# Patient Record
Sex: MALE | Race: WHITE | NOT HISPANIC OR LATINO | Employment: OTHER | URBAN - METROPOLITAN AREA
[De-identification: names, ages, dates, MRNs, and addresses within clinical notes are randomized per-mention and may not be internally consistent; named-entity substitution may affect disease eponyms.]

---

## 2017-01-11 PROBLEM — I70.90 ATHEROSCLEROSIS: Status: ACTIVE | Noted: 2017-01-11

## 2017-05-01 ENCOUNTER — OFFICE VISIT (OUTPATIENT)
Dept: SURGERY | Facility: CLINIC | Age: 67
End: 2017-05-01
Payer: COMMERCIAL

## 2017-05-01 VITALS
BODY MASS INDEX: 28.27 KG/M2 | WEIGHT: 226.19 LBS | HEART RATE: 75 BPM | TEMPERATURE: 97 F | DIASTOLIC BLOOD PRESSURE: 74 MMHG | SYSTOLIC BLOOD PRESSURE: 122 MMHG

## 2017-05-01 DIAGNOSIS — R10.32 GROIN PAIN, LEFT: Primary | ICD-10-CM

## 2017-05-01 PROCEDURE — 3078F DIAST BP <80 MM HG: CPT | Mod: S$GLB,,, | Performed by: SURGERY

## 2017-05-01 PROCEDURE — 99204 OFFICE O/P NEW MOD 45 MIN: CPT | Mod: S$GLB,,, | Performed by: SURGERY

## 2017-05-01 PROCEDURE — 99999 PR PBB SHADOW E&M-EST. PATIENT-LVL II: CPT | Mod: PBBFAC,,, | Performed by: SURGERY

## 2017-05-01 PROCEDURE — 1160F RVW MEDS BY RX/DR IN RCRD: CPT | Mod: S$GLB,,, | Performed by: SURGERY

## 2017-05-01 PROCEDURE — 3074F SYST BP LT 130 MM HG: CPT | Mod: S$GLB,,, | Performed by: SURGERY

## 2017-05-01 NOTE — PROGRESS NOTES
Subjective:       Patient ID: Petey Webber is a 66 y.o. male.    Chief Complaint: Consult (possible inguinal hernia)    HPI this is a 66-year-old gentleman referred for evaluation of a possible left inguinal hernia.  He has never seen a bulge in that area.  He has pain while lifting.  The pain was first noticed when golfing back in February.  He subsequent slipped while fishing and felt the pain again.  It had improved somewhat in between the 2 occurrences.  He has never seen a bulge in the area.  He is not having any nausea or vomiting.  He has normal bowel movements.  He denies any fever or chills.  He says the pain recently has been all day long and neck see wakes him up early in the morning.  He has no urinary symptoms.  He has no history of abdominal surgery.  He does have a reported history of 3 TIAs in the 90s but no problems since then.  He is not medicated for this.    Review of Systems   Constitutional: Negative for activity change, chills, fever and unexpected weight change.   HENT: Negative for congestion, hearing loss, sore throat and voice change.    Eyes: Negative.    Respiratory: Negative for cough, shortness of breath and wheezing.    Cardiovascular: Negative for chest pain and palpitations.   Gastrointestinal: Negative for abdominal pain, blood in stool, nausea and vomiting.   Genitourinary: Negative for dysuria, frequency and hematuria.        Groin pain   Musculoskeletal: Negative for arthralgias, back pain and neck pain.   Skin: Negative for color change and wound.   Allergic/Immunologic: Negative.    Neurological: Negative for dizziness, weakness and headaches.   Hematological: Negative for adenopathy. Does not bruise/bleed easily.   Psychiatric/Behavioral: Negative for confusion and dysphoric mood. The patient is not nervous/anxious.      Objective:     Physical Exam   Constitutional: He is oriented to person, place, and time. He appears well-developed and well-nourished. No distress.   HENT:    Head: Normocephalic and atraumatic.   Mouth/Throat: Oropharynx is clear and moist. No oropharyngeal exudate.   Eyes: Conjunctivae and EOM are normal. Pupils are equal, round, and reactive to light. No scleral icterus.   Neck: Normal range of motion. Neck supple. No thyromegaly present.   Cardiovascular: Normal rate, regular rhythm, normal heart sounds and intact distal pulses.    No murmur heard.  Pulmonary/Chest: Effort normal and breath sounds normal. No respiratory distress. He has no wheezes. He has no rales.   Abdominal: Soft. Bowel sounds are normal. He exhibits no distension and no mass. There is no tenderness. No hernia.   Genitourinary:   Genitourinary Comments: Careful examination of the inguinal area does not reveal any evidence of an inguinal hernia.  The external ring is palpable with no bulging.  He is tender in the left groin on manipulation.  There is no tenderness or palpable hernia on the right.   Musculoskeletal: Normal range of motion. He exhibits no edema or tenderness.   Lymphadenopathy:     He has no cervical adenopathy.   Neurological: He is alert and oriented to person, place, and time. No cranial nerve deficit.   Skin: Skin is warm and dry. No rash noted. No erythema.   Psychiatric: He has a normal mood and affect. His behavior is normal.     Assessment:     Encounter Diagnosis   Name Primary?    Groin pain, left Yes       Plan:      1.  No evidence of inguinal hernia at this time.  We discussed the symptoms of a groin strain.  He will observe the area for development of a bulge.  2.  Plan to follow-up with me in 6 months for reevaluation or sooner if he develops a bulge of the pain worsens.  We could consider imaging at that time.  3.  All questions were answered.

## 2017-07-20 PROBLEM — A69.20 LYME DISEASE, ACUTE: Status: ACTIVE | Noted: 2017-07-20

## 2018-02-21 PROBLEM — I67.2 CEREBRAL ATHEROSCLEROSIS: Status: ACTIVE | Noted: 2018-02-21

## 2018-10-03 PROBLEM — I63.9 CEREBELLAR INFARCTION: Status: ACTIVE | Noted: 2018-10-03

## 2018-10-04 ENCOUNTER — TELEPHONE (OUTPATIENT)
Dept: HEMATOLOGY/ONCOLOGY | Facility: CLINIC | Age: 68
End: 2018-10-04

## 2018-10-04 NOTE — TELEPHONE ENCOUNTER
----- Message from Cayla Reveles sent at 10/4/2018  1:06 PM CDT -----  Contact: [phone]  Type: Needs appointment    Who Called:  Patient  Best Call Back Number: [phone]  Additional Information: Patient is being referred by Dr. Manuel Robles/needs appointment (only information given)please call back to schedule or advise.

## 2018-10-09 ENCOUNTER — OFFICE VISIT (OUTPATIENT)
Dept: HEMATOLOGY/ONCOLOGY | Facility: CLINIC | Age: 68
End: 2018-10-09
Payer: MEDICARE

## 2018-10-09 VITALS
WEIGHT: 237.19 LBS | RESPIRATION RATE: 18 BRPM | DIASTOLIC BLOOD PRESSURE: 81 MMHG | HEIGHT: 74 IN | HEART RATE: 81 BPM | SYSTOLIC BLOOD PRESSURE: 124 MMHG | BODY MASS INDEX: 30.44 KG/M2 | TEMPERATURE: 98 F

## 2018-10-09 DIAGNOSIS — G45.9 TIA (TRANSIENT ISCHEMIC ATTACK): Primary | ICD-10-CM

## 2018-10-09 PROCEDURE — 99999 PR PBB SHADOW E&M-EST. PATIENT-LVL III: CPT | Mod: PBBFAC,,, | Performed by: INTERNAL MEDICINE

## 2018-10-09 PROCEDURE — 99213 OFFICE O/P EST LOW 20 MIN: CPT | Mod: PBBFAC,PN | Performed by: INTERNAL MEDICINE

## 2018-10-09 PROCEDURE — 99204 OFFICE O/P NEW MOD 45 MIN: CPT | Mod: S$PBB,,, | Performed by: INTERNAL MEDICINE

## 2018-10-09 NOTE — PROGRESS NOTES
Initial consultation for Petey Webber    HPI    67 years old  male [facility] for hyper[facility]agulable workup.  Patient is referred here by Dr. Robles for history of a TIA and recent headache for concern of clotting disorder. Per patient, he had a TIA 10 years ago, and recently he had headache, went to hospital for evaluation.  MRI of the head and the multiple study has been done.  MR images shows no restricted diffusion there are chronic in fraction in the right cerebral cerebellar hemisphere.  There are mild chronic ischemic small vessel degenerative white matter changes.  There is no mass or mass effect.  The ventricles are normal in size and configuration.  There are no abnormal extra-axial collections.  There is no bleeding.  Pituitary optic chiasm are grossly normal.  MR of the head shows no significant intracranial stenosis erupt vascular cutoff or aneurysm.  Patient denies any history of blood clot disorders, denies any history of blood disorders, both maternal and paternal do not have any history of clotting disorders.  Patient is a former smoker he quit in 1992 prior to that he smoked daily.  He does admit that he drinks on a daily basis.  He is  and retired from jobs.      Patient's medical history including hypertension or diabetes.   Per  Dr. Robles note, he was instructed to take aspirin and Plavix, however patient is only on aspirin high dose at this time.      Past Medical History:   Diagnosis Date    Atherosclerosis     CVA (cerebral infarction)     HTN (hypertension), malignant 7/8/2015    Hyperlipidemia 7/8/2015     Social History     Socioeconomic History    Marital status:      Spouse name: Xuanorrlc    Number of children: 1    Years of education: None    Highest education level: None   Social Needs    Financial resource strain: None    Food insecurity - worry: None    Food insecurity - inability: None    Transportation needs - medical: None     Transportation needs - non-medical: None   Occupational History    None   Tobacco Use    Smoking status: Former Smoker     Packs/day: 1.00     Years: 25.00     Pack years: 25.00     Last attempt to quit: 1992     Years since quittin.7    Smokeless tobacco: Never Used   Substance and Sexual Activity    Alcohol use: Yes     Alcohol/week: 7.2 oz     Types: 12 Glasses of wine per week    Drug use: No    Sexual activity: Yes     Partners: Female   Other Topics Concern    None   Social History Narrative    None         Subjective      Review of Systems   Constitutional: Negative for appetite change, fatigue and unexpected weight change.   HENT: Negative for mouth sores.   Eyes: Negative for visual disturbance.   Respiratory: Negative for cough and shortness of breath.   Cardiovascular: Negative for chest pain.   Gastrointestinal: Negative for diarrhea.   Genitourinary: Negative for frequency.   Musculoskeletal: Negative for back pain.   Skin: Negative for rash.   Neurological:  Headache and dizzness  Hematological: Negative for adenopathy.   Psychiatric/Behavioral: The patient is not nervous/anxious.   All other systems reviewed and are negative.     Objective    Physical Exam   Vitals:    10/09/18 0844   BP: 124/81   Pulse: 81   Resp: 18   Temp: 98.1 °F (36.7 °C)       Constitutional: patient is oriented to person, place, and time. patient appears well-developed and well-nourished. No distress.   HENT:   Right Ear: External ear normal.   Left Ear: External ear normal.   Nose: Nose normal.   Mouth/Throat: Oropharynx is clear and moist. No oropharyngeal exudate.   Teeth, gums and lips are normal   No sinus tenderness   Palate, tongue, posterior pharynx are normal   Eyes: Conjunctivae and lids are normal.   Neck: Trachea normal and normal range of motion. No thyromegaly   Cardiovascular: Normal rate, regular rhythm, normal heart sounds, intact distal pulses and normal pulses.   No murmur heard.   No edema,  no tenderness in the extremities.   Pulmonary/Chest: Effort normal and breath sounds normal. No accessory muscle usage. patient has no wheezes..   Abdominal: Soft. Normal appearance and bowel sounds are normal. patient exhibits no distension and no mass. There is no hepatosplenomegaly. There is no tenderness.   Musculoskeletal: Normal range of motion.   Gait is normal   No clubbing, cyanosis     Lymphadenopathy:   Head (right side): No submental and no submandibular adenopathy present.   Head (left side): No submental and no submandibular adenopathy present.   patient has no cervical adenopathy.   Right: No supraclavicular adenopathy present.   Left: No supraclavicular adenopathy present.   Neurological: patient is alert and oriented to person, place, and time. patient has normal strength and normal reflexes. No sensory deficit. Gait normal.   Skin: Skin is warm, dry and intact. No bruising, no lesion and no rash noted. No cyanosis. Nails show no clubbing.   No lesions   Psychiatric: patient has a normal mood and affect. patient speech is normal and behavior is normal. Judgment normal. Cognition and memory are normal.   Vitals reviewed.     Lab Results   Component Value Date    WBC 5.85 10/03/2018    HGB 14.8 10/03/2018    HCT 44.1 10/03/2018    MCV 87 10/03/2018     10/03/2018     CMP  Sodium   Date Value Ref Range Status   10/03/2018 140 136 - 145 mmol/L Final   09/04/2015 140 137 - 145 MMOL/L Final     Potassium   Date Value Ref Range Status   10/03/2018 4.5 3.5 - 5.1 mmol/L Final   09/04/2015 4.8 3.5 - 5.1 MMOL/L Final     Chloride   Date Value Ref Range Status   10/03/2018 103 95 - 110 mmol/L Final   09/04/2015 105 98 - 107 MMOL/L Final     CO2   Date Value Ref Range Status   10/03/2018 26 22 - 31 mmol/L Final     Glucose   Date Value Ref Range Status   10/03/2018 144 (H) 70 - 110 mg/dL Final     Comment:     The ADA recommends the following guidelines for fasting glucose:  Normal:       less than 100  mg/dL  Prediabetes:  100 mg/dL to 125 mg/dL  Diabetes:     126 mg/dL or higher       BUN, Bld   Date Value Ref Range Status   10/03/2018 21 9 - 21 mg/dL Final     Creatinine   Date Value Ref Range Status   10/03/2018 0.88 0.50 - 1.40 mg/dL Final   09/04/2015 1.17 0.66 - 1.25 MG/DL Final     Calcium   Date Value Ref Range Status   10/03/2018 9.2 8.4 - 10.2 mg/dL Final     Total Protein   Date Value Ref Range Status   10/03/2018 6.5 6.0 - 8.4 g/dL Final     Albumin   Date Value Ref Range Status   10/03/2018 4.0 3.5 - 5.2 g/dL Final   09/04/2015 4.0 3.5 - 5.0 G/DL Final     Total Bilirubin   Date Value Ref Range Status   10/03/2018 0.8 0.2 - 1.3 mg/dL Final     Alkaline Phosphatase   Date Value Ref Range Status   10/03/2018 48 38 - 145 U/L Final     AST ([place])   Date Value Ref Range Status   04/15/2016 31 17 - 59 U/L Final     AST   Date Value Ref Range Status   10/03/2018 29 17 - 59 U/L Final     ALT   Date Value Ref Range Status   10/03/2018 63 (H) 10 - 44 U/L Final     Anion Gap   Date Value Ref Range Status   10/03/2018 11 8 - 16 mmol/L Final     eGFR if    Date Value Ref Range Status   10/03/2018 >60 >60 mL/min/1.73 m^2 Final     eGFR if non    Date Value Ref Range Status   10/03/2018 >60 >60 mL/min/1.73 m^2 Final     Comment:     Calculation used to obtain the estimated glomerular filtration  rate (eGFR) is the CKD-EPI equation.            Assessment/plan    [] Prior history of a TIA, recent history of headache with a negative images workup.  Patient is asymptomatic at baseline doing this clinical visit.  Patient has negative family history of hypercoagulable disease.  Patient do not have any autoimmune disease.    [] There are chronic infarctions in the right cerebellar hemisphere.   -I will order basic hypercoagulable workup RTC 1 month  -continue routine malignancy screening per PCP      There are no diagnoses linked to this encounter.

## 2018-10-17 ENCOUNTER — TELEPHONE (OUTPATIENT)
Dept: NEUROSURGERY | Facility: CLINIC | Age: 68
End: 2018-10-17

## 2019-02-05 ENCOUNTER — OFFICE VISIT (OUTPATIENT)
Dept: NEUROLOGY | Facility: CLINIC | Age: 69
End: 2019-02-05
Payer: MEDICARE

## 2019-02-05 VITALS
SYSTOLIC BLOOD PRESSURE: 122 MMHG | HEART RATE: 69 BPM | WEIGHT: 237 LBS | DIASTOLIC BLOOD PRESSURE: 84 MMHG | RESPIRATION RATE: 17 BRPM | BODY MASS INDEX: 30.42 KG/M2 | HEIGHT: 74 IN

## 2019-02-05 DIAGNOSIS — I63.9 CEREBELLAR INFARCTION: Primary | ICD-10-CM

## 2019-02-05 DIAGNOSIS — G45.9 TIA (TRANSIENT ISCHEMIC ATTACK): ICD-10-CM

## 2019-02-05 PROCEDURE — 99999 PR PBB SHADOW E&M-EST. PATIENT-LVL III: CPT | Mod: PBBFAC,,, | Performed by: PSYCHIATRY & NEUROLOGY

## 2019-02-05 PROCEDURE — 99204 OFFICE O/P NEW MOD 45 MIN: CPT | Mod: S$PBB,,, | Performed by: PSYCHIATRY & NEUROLOGY

## 2019-02-05 PROCEDURE — 99204 PR OFFICE/OUTPT VISIT, NEW, LEVL IV, 45-59 MIN: ICD-10-PCS | Mod: S$PBB,,, | Performed by: PSYCHIATRY & NEUROLOGY

## 2019-02-05 PROCEDURE — 99213 OFFICE O/P EST LOW 20 MIN: CPT | Mod: PBBFAC,PO | Performed by: PSYCHIATRY & NEUROLOGY

## 2019-02-05 PROCEDURE — 99999 PR PBB SHADOW E&M-EST. PATIENT-LVL III: ICD-10-PCS | Mod: PBBFAC,,, | Performed by: PSYCHIATRY & NEUROLOGY

## 2019-02-05 RX ORDER — AMOXICILLIN 500 MG
CAPSULE ORAL DAILY
COMMUNITY
End: 2020-10-28

## 2019-02-05 NOTE — LETTER
February 5, 2019      AILYN Robles Jr., MD  [address]  [facility] [address]           [facility] [place]  [address] [facility] [address] [place] [address]  Phone: [phone]  Fax: [phone]          Patient: Petey Webber   MR Number: [id number]   YOB: 1950   Date of Visit: 2/5/2019       Dear Dr. AILYN Robles Jr.:    Thank you for referring Petey Webber to me for evaluation. Attached you will find relevant portions of my assessment and plan of care.    If you have questions, please do not hesitate to call me. I look forward to following Petey Webber along with you.    Sincerely,    Silvana Munoz MD    Enclosure  CC:  No Recipients    If you would like to receive this communication electronically, please contact [email or web address] or [phone] to request more information on [facility] access.    For providers and/or their staff who would like to refer a patient to [facility], please contact us through our one-stop-shop provider referral line, [facility], at [phone].    If you feel you have received this communication in error or would no longer like to receive these types of communications, please e-mail [email or web address]

## 2019-02-05 NOTE — PROGRESS NOTES
Date of service: 2/5/2019  Referring provider: Dr. AILYN Robles Jr.    Subjective:      Chief complaint: Headache       Patient ID: Petey Webber is a 68 y.o. gentleman with type 2 diabetes, hypertension, hyperlipidemia, history of stroke, who was presenting as a new patient evaluation of headache    History of Present Illness    ORIGINAL HEADACHE HISTORY - 02/05/2019  Age at onset and course over time:  In early Sept 2018 he had 2 weeks of pressure in the left temple followed by the event below.    He was seen at [facility] [place] on 09/27/2018 for possible TIA.  This started with dizziness (LH) and nausea followed by right face (corner of mouth and up to jaw) being numb (but not arm and leg). He doesn't know if there was a droop but he doesn't think there was. No hearing changes. No change to vision. No double vision. Total event time was 30 min. Subsided with rest. No photo or phonophobia.    In the past he had these same events, in the 1990s, with right sided nona-numbness and slurred speech. He has had at least 6 of these in the past that were stress induced.     In the hospital Brain MRI showed chronic stroke in the right cerebellar hemisphere.  MRA brain was normal. Echo showed ejection fraction 55-65%.  He did not have any posterior neck vessel imaging. There was concern that this was either vascular or related to migraine aura.  His aspirin was increased from baby to 325 mg daily.  He was continued on atorvastatin 80 mg. His LDL has never been 70 or below even with full dose statin. He takes it in the AM. A1c 6.5 LDL 90    He no longer has the headache described below, last time was in the fall of 2018 -   Location:  Left temple   Quality:  [x] pressure [] tight [] throbbing [] sharp [] stabbing   Severity:  Current 0, range 0 to 3  Duration:  Min  Frequency:  Seldom  Headaches awaken at night?:    Worst time of day:   Associated with: [] photophobia []  phonophobia [] osmophobia []  blurred vision  [] double vision [] loss of appetite [x] nausea [] vomiting [x] dizziness [] vertigo  [] tinnitus [] irritability [] sinus pressure [] problems with concentration   [] neck tightness   Alleviated by:  [] sleep [] darkness [] massage [] heat [] ice [] medication quiet/rest  Exacerbated by:  [] fatigue [] light [] noise [] smells [] coughing [] sneezing  [] bending over [] ovulation [] menses [] alcohol [] change in weather []  stress  Ipsilateral autonomic: [] nasal congestion [] lacrimation [] ptosis [] injection [] edema [] foreign body sensation [] ear fullness   ICP:   Sleep habits:  Caffeine intake: 0  Gyn status (if female):     Current acute treatment:  -- none     Current prevention:  --   -- Atorvastatin 80mg    Previously tried/failed acute treatment:  -- none    Previously tried/failed preventative treatment:  -- none     Review of patient's allergies indicates:  No Known Allergies  Current Outpatient Medications   Medication Sig Dispense Refill    aspirin (ECOTRIN) 325 MG EC tablet Take 325 mg by mouth once daily.      atorvastatin (LIPITOR) 80 MG tablet Take 1 tablet (80 mg total) by mouth every evening. 90 tablet 3    blood sugar diagnostic (FREESTYLE LITE STRIPS) Strp USE TO TAKE BLOOD SUGAR  strip 3    CALCIUM CARBONATE/VITAMIN D3 (VITAMIN D-3 ORAL) Take by mouth.      cinnamon bark (CINNAMON) 500 mg capsule Take 500 mg by mouth once daily.      coenzyme Q10 (CO Q-10) 100 mg capsule Take 200 mg by mouth once daily.      fenofibrate micronized (LOFIBRA) 200 MG Cap Take 1 capsule (200 mg total) by mouth once daily. 90 capsule 3    finasteride (PROSCAR) 5 mg tablet TAKE 1 TABLET EVERY DAY 90 tablet 4    fish oil-omega-3 fatty acids 300-1,000 mg capsule Take by mouth once daily.      glucosamine-chondroitin 500-400 mg tablet Take 1 tablet by mouth once daily.      irbesartan (AVAPRO) 300 MG tablet Take 1 tablet (300 mg total) by mouth once daily. 90 tablet 3     lancets 28 gauge Misc 1 lancet by Misc.(Non-Drug; Combo Route) route 2 (two) times daily. 200 each 3    metFORMIN (GLUCOPHAGE) 500 MG tablet TAKE 1 TABLET TWICE DAILY WITH MEALS 180 tablet 4    FLAXSEED OIL MISC by Misc.(Non-Drug; Combo Route) route.       No current facility-administered medications for this visit.        Past Medical History  Past Medical History:   Diagnosis Date    Atherosclerosis     CVA (cerebral infarction)     HTN (hypertension), malignant 2015    Hyperlipidemia 2015       Past Surgical History  Past Surgical History:   Procedure Laterality Date    broken jaw      COLONOSCOPY      EYE SURGERY         Family History  Family History   Problem Relation Age of Onset    Breast cancer Mother     Dementia Mother     Heart disease Father     Heart attack Father     Heart failure Father     Diabetes Father        Social History  Social History     Socioeconomic History    Marital status:      Spouse name: Tracee    Number of children: 1    Years of education: Not on file    Highest education level: Not on file   Social Needs    Financial resource strain: Not on file    Food insecurity - worry: Not on file    Food insecurity - inability: Not on file    Transportation needs - medical: Not on file    Transportation needs - non-medical: Not on file   Occupational History    Not on file   Tobacco Use    Smoking status: Former Smoker     Packs/day: 1.00     Years: 25.00     Pack years: 25.00     Last attempt to quit: 1992     Years since quittin.1    Smokeless tobacco: Never Used   Substance and Sexual Activity    Alcohol use: Yes     Alcohol/week: 7.2 oz     Types: 12 Glasses of wine per week    Drug use: No    Sexual activity: Yes     Partners: Female   Other Topics Concern    Not on file   Social History Narrative    Not on file        Review of Systems  14-point review of systems as follows:   No check bry indicates NEGATIVE response    Constitutional: [] weight loss, [] change to appetite   Eyes: [] change in vision, [] double vision   Ears, nose, mouth, throat: [] frequent nose bleeds, [] ringing in the ears   Respiratory: [x] cough, [] wheezing   Cardiovascular: [] chest pain, [] palpitations   Gastrointestinal: [] jaundice, [] nausea/vomiting   Genitourinary: [] incontinence, [] burning with urination   Hematologic/lymphatic: [] easy bruising/bleeding, [] night sweats   Neurological: [] numbness, [] weakness   Endocrine: [] fatigue, [] heat/cold intolerance   Allergy/Immunologic: [] fevers, [] chills   Musculoskeletal: [] muscle pain, [] joint pain   Psychiatric: [] thoughts of harming self/others, [] depression   Integumentary: [] rashes, [] sores that do not heal     Objective:        Vitals:    02/05/19 0854   BP: 122/84   Pulse: 69   Resp: 17     Body mass index is 30.43 kg/m².    Constitutional: appears in no acute distress, well-developed, well-nourished     Eyes: normal conjunctiva, PERRLA    Ears, nose, mouth, throat: external appearance of ears and nose normal, hearing intact     Cardiovascular: regular rate and rhythm, no murmurs appreciated    Respiratory: unlabored respirations, breath sounds normal bilaterally    Gastrointestinal: no visible abdominal masses, no guarding, no visible hernia    Musculoskeletal: normal tone in all four extremities. No atrophy. No abnormal movements. No pronator drift. No orbit. Symmetric finger tapping. Normal regular gait and station.  Tandem gait is slightly unsteady.  Digits and nails normal.      Psychiatric: normal judgment and insight. Oriented to person, place, and time.     Neurologic:   Cortical functions: recent and remote memory intact, normal attention span and concentration, speech fluent, adequate fund of knowledge   Cranial nerves: visual fields full, PERRLA, EOMI, symmetric facial strength, hearing intact, palate elevates symmetrically, shoulder shrug 5/5, tongue protrudes midline    Reflexes: 2+ in the upper and lower extremities, no Egan  Sensation: intact to temperature throughout. Romberg is negative.  Coordination: normal finger to nose    Data Review:     I have personally reviewed the referring provider's notes, labs, & imaging made available to me today.      RADIOLOGY STUDIES:  I have personally reviewed the pertinent images performed.     EXAM: MRI BRAIN WO CONTRAST, MRI ANGIOGRAM HEAD WO CONTRAST, 9/28/2018 9:45 AM    COMPARISON: Outside head CT 9/27/18    HISTORY: TIA                TECHNIQUE: Multiplanar, multisequence MR images of the brain were obtained without IV contrast. 3-D time-of-flight MR angiographic images of the [place] were also obtained and 3D rotating MIP projections were made.    FINDINGS:  BRAIN:  There is no restricted diffusion. There are chronic infarctions in the right cerebellar hemisphere. There are mild  chronic ischemic small vessel degenerative white matter changes.  There is no mass or mass effect. The ventricles are normal in size and configuration. There are no abnormal extraaxial collections. There is no parenchymal hemorrhage. Corpus callosum is normal. Pituitary and optic chiasm are grossly normal. Cerebellar tonsils are appropriately positioned.    MRA HEAD:  Intracranial vertebral arteries, basilar artery, and posterior cerebral arteries are widely patent. Intracranial internal carotid arteries, anterior, and middle cerebral arteries are widely patent. No significant intracranial stenosis, abrupt vascular cutoff, or intracranial aneurysm seen.          Lab Results   Component Value Date     10/03/2018     09/04/2015    K 4.5 10/03/2018    K 4.8 09/04/2015     10/03/2018     09/04/2015    CO2 26 10/03/2018    BUN 21 10/03/2018    CREATININE 0.88 10/03/2018    CREATININE 1.17 09/04/2015     (H) 10/03/2018    HGBA1C 6.5 (H) 10/03/2018    AST 29 10/03/2018    AST 31 04/15/2016    ALT 63 (H) 10/03/2018     ALBUMIN 4.0 10/03/2018    ALBUMIN 4.0 09/04/2015    PROT 6.5 10/03/2018    BILITOT 0.8 10/03/2018    CHOL 141 10/03/2018    HDL 31 (L) 10/03/2018    LDLCALC 90.8 10/03/2018    LDLCALC 78 09/04/2015    TRIG 96 10/03/2018       Lab Results   Component Value Date    WBC 5.85 10/03/2018    HGB 14.8 10/03/2018    HCT 44.1 10/03/2018    MCV 87 10/03/2018     10/03/2018       Lab Results   Component Value Date    TSH 1.980 06/02/2017           Assessment & Plan:       Problem List Items Addressed This Visit        Neuro    TIA (transient ischemic attack)    Overview     Recurrent stereotyped events dating back to the 1990s, involving dizziness/nausea/right-sided numbness with gradual onset.  This stereotyped nature raises concern for a cephalic aura as the cause however this is a diagnosis of exclusion and clinically I cannot confidently make this diagnosis as he had no other migraine symptoms such as phonophobia, photophobia, or visual aura.  The symptoms localize to the posterior circulation so I will complete a stroke workup by ordering a CT angiogram of his neck and also take it more detailed look at the circulation of the brain as the only wait to get recurrent stereotyped TIAs is with a intracranial stenosis.    If this articulation is completely normal we can continue his current management of aspirin statin.  If there is intracranial stenosis I would recommend dual aspirin and Plavix.  If there is extracranial stenosis I would call this aspirin failure and convert him to Plavix.  I am concerned that he is on a high-dose statin but still has not achieve an LDL less than 70.  I have asked him to take his atorvastatin at night may be this will have a better effect.  We could also consider changing over to full-dose rosuvastatin.         Relevant Orders    CTA Head and Neck (xpd)    Cerebellar infarction - Primary    Overview     See Care Everywhere reports 9/2018.  History of right cerebellar strokes.                      TESTING:  -- CTA head and neck to evaluate for blood vessel narrowing in the head or neck     REFERRALS:  -- none     PREVENTION (use daily regardless of headache):  -- none     AS-NEEDED TREATMENT (use total no more than 10 days per month unless otherwise stated):  -- none      STROKE:  -- continue aspirin 325mg at this time - may conver this over to Plavix or add plavix after your imaging is done  -- change lipitor 80mg to night time     Follow-up in about 2 months (around 4/5/2019).    Silvana Munoz MD

## 2019-02-05 NOTE — PATIENT INSTRUCTIONS
TESTING:  -- CTA head and neck to evaluate for blood vessel narrowing in the head or neck     REFERRALS:  -- none     PREVENTION (use daily regardless of headache):  -- none     AS-NEEDED TREATMENT (use total no more than 10 days per month unless otherwise stated):  -- none      STROKE:  -- continue aspirin 325mg at this time - may conver this over to Plavix or add plavix after your imaging is done  -- change lipitor 80mg to night time

## 2019-02-07 DIAGNOSIS — G45.9 TIA (TRANSIENT ISCHEMIC ATTACK): Primary | ICD-10-CM

## 2019-02-08 ENCOUNTER — HOSPITAL ENCOUNTER (OUTPATIENT)
Dept: RADIOLOGY | Facility: HOSPITAL | Age: 69
Discharge: HOME | End: 2019-02-08
Attending: PSYCHIATRY & NEUROLOGY
Payer: MEDICARE

## 2019-02-08 DIAGNOSIS — G45.9 TIA (TRANSIENT ISCHEMIC ATTACK): ICD-10-CM

## 2019-02-08 PROCEDURE — 70498 CT ANGIOGRAPHY NECK: CPT | Mod: 26,,, | Performed by: RADIOLOGY

## 2019-02-08 PROCEDURE — 70496 CTA HEAD AND NECK (XPD): ICD-10-PCS | Mod: 26,,, | Performed by: RADIOLOGY

## 2019-02-08 PROCEDURE — 25500020 PHARM REV CODE 255: Mod: PO | Performed by: PSYCHIATRY & NEUROLOGY

## 2019-02-08 PROCEDURE — 70496 CT ANGIOGRAPHY HEAD: CPT | Mod: TC,PO

## 2019-02-08 PROCEDURE — 70496 CT ANGIOGRAPHY HEAD: CPT | Mod: 26,,, | Performed by: RADIOLOGY

## 2019-02-08 PROCEDURE — 70498 CTA HEAD AND NECK (XPD): ICD-10-PCS | Mod: 26,,, | Performed by: RADIOLOGY

## 2019-02-08 RX ADMIN — IOHEXOL 100 ML: 350 INJECTION, SOLUTION INTRAVENOUS at 01:02

## 2019-04-12 PROBLEM — Z00.00 PREVENTATIVE HEALTH CARE: Status: ACTIVE | Noted: 2019-04-12

## 2019-04-12 PROBLEM — Z00.00 PREVENTATIVE HEALTH CARE: Status: RESOLVED | Noted: 2019-04-12 | Resolved: 2019-04-12

## 2019-07-15 PROBLEM — Z00.00 PREVENTATIVE HEALTH CARE: Status: RESOLVED | Noted: 2019-04-12 | Resolved: 2019-07-15

## 2020-10-27 PROBLEM — S63.522A: Status: ACTIVE | Noted: 2020-10-27

## 2020-10-27 PROBLEM — M65.90 SYNOVITIS AND TENOSYNOVITIS, UNSPECIFIED: Status: ACTIVE | Noted: 2020-10-27

## 2020-10-27 PROBLEM — M65.9 SYNOVITIS AND TENOSYNOVITIS, UNSPECIFIED: Status: ACTIVE | Noted: 2020-10-27

## 2020-11-03 PROBLEM — S63.522A: Status: RESOLVED | Noted: 2020-10-27 | Resolved: 2020-11-03

## 2020-11-03 PROBLEM — A69.20 LYME DISEASE, ACUTE: Status: RESOLVED | Noted: 2017-07-20 | Resolved: 2020-11-03

## 2020-11-08 PROBLEM — I63.9 CEREBELLAR INFARCTION: Status: RESOLVED | Noted: 2018-10-03 | Resolved: 2020-11-08

## 2020-12-07 PROBLEM — M65.90 SYNOVITIS AND TENOSYNOVITIS, UNSPECIFIED: Status: RESOLVED | Noted: 2020-10-27 | Resolved: 2020-12-07

## 2020-12-07 PROBLEM — M65.9 SYNOVITIS AND TENOSYNOVITIS, UNSPECIFIED: Status: RESOLVED | Noted: 2020-10-27 | Resolved: 2020-12-07

## 2020-12-07 PROBLEM — S63.522A: Status: RESOLVED | Noted: 2020-10-27 | Resolved: 2020-12-07

## 2020-12-21 ENCOUNTER — PATIENT MESSAGE (OUTPATIENT)
Dept: OTHER | Facility: OTHER | Age: 70
End: 2020-12-21

## 2021-01-19 PROBLEM — G89.29 CHRONIC BILATERAL LOW BACK PAIN WITH RIGHT-SIDED SCIATICA: Status: ACTIVE | Noted: 2021-01-19

## 2021-01-19 PROBLEM — M47.816 FACET ARTHROPATHY, LUMBAR: Status: ACTIVE | Noted: 2021-01-19

## 2021-01-19 PROBLEM — M53.3 PAIN OF LEFT SACROILIAC JOINT: Status: ACTIVE | Noted: 2021-01-19

## 2021-01-19 PROBLEM — M54.41 CHRONIC BILATERAL LOW BACK PAIN WITH RIGHT-SIDED SCIATICA: Status: ACTIVE | Noted: 2021-01-19

## 2021-01-22 ENCOUNTER — PATIENT MESSAGE (OUTPATIENT)
Dept: ADMINISTRATIVE | Facility: OTHER | Age: 71
End: 2021-01-22
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
## 2021-05-21 PROBLEM — S16.1XXD CERVICAL STRAIN, ACUTE, SUBSEQUENT ENCOUNTER: Status: ACTIVE | Noted: 2021-05-21

## 2021-08-23 ENCOUNTER — CLINICAL SUPPORT (OUTPATIENT)
Dept: URGENT CARE | Facility: CLINIC | Age: 71
End: 2021-08-23
Payer: MEDICARE

## 2021-08-23 DIAGNOSIS — Z20.822 EXPOSURE TO COVID-19 VIRUS: Primary | ICD-10-CM

## 2021-08-23 DIAGNOSIS — U07.1 COVID-19 VIRUS DETECTED: ICD-10-CM

## 2021-08-23 LAB
CTP QC/QA: YES
SARS-COV-2 RDRP RESP QL NAA+PROBE: POSITIVE

## 2021-08-23 PROCEDURE — U0002 COVID-19 LAB TEST NON-CDC: HCPCS | Mod: QW,CR,S$GLB, | Performed by: PHYSICIAN ASSISTANT

## 2021-08-23 PROCEDURE — 99211 PR OFFICE/OUTPT VISIT, EST, LEVL I: ICD-10-PCS | Mod: S$GLB,CS,, | Performed by: PHYSICIAN ASSISTANT

## 2021-08-23 PROCEDURE — U0002: ICD-10-PCS | Mod: QW,CR,S$GLB, | Performed by: PHYSICIAN ASSISTANT

## 2021-08-23 PROCEDURE — 99211 OFF/OP EST MAY X REQ PHY/QHP: CPT | Mod: S$GLB,CS,, | Performed by: PHYSICIAN ASSISTANT

## 2021-11-10 PROBLEM — K80.00 CALCULUS OF GALLBLADDER WITH ACUTE CHOLECYSTITIS WITHOUT OBSTRUCTION: Status: RESOLVED | Noted: 2021-11-10 | Resolved: 2021-11-10

## 2021-11-10 PROBLEM — K80.00 CALCULUS OF GALLBLADDER WITH ACUTE CHOLECYSTITIS WITHOUT OBSTRUCTION: Status: ACTIVE | Noted: 2021-11-10

## 2022-08-30 PROBLEM — M43.6 NECK STIFFNESS: Status: ACTIVE | Noted: 2022-08-30

## 2022-11-18 PROBLEM — M43.6 NECK STIFFNESS: Status: RESOLVED | Noted: 2022-08-30 | Resolved: 2022-11-18

## 2023-05-26 PROBLEM — M54.2 CERVICALGIA: Status: ACTIVE | Noted: 2023-05-26

## 2023-08-08 LAB — HBA1C MFR BLD: 7.1 % (ref 4–6)

## 2024-06-24 LAB — HBA1C MFR BLD: 7.4 % (ref 4–6)
